# Patient Record
Sex: FEMALE | Race: WHITE | URBAN - METROPOLITAN AREA
[De-identification: names, ages, dates, MRNs, and addresses within clinical notes are randomized per-mention and may not be internally consistent; named-entity substitution may affect disease eponyms.]

---

## 2020-12-19 ENCOUNTER — OFFICE VISIT (OUTPATIENT)
Dept: URGENT CARE | Age: 25
End: 2020-12-19
Payer: COMMERCIAL

## 2020-12-19 VITALS — RESPIRATION RATE: 16 BRPM | OXYGEN SATURATION: 99 % | HEART RATE: 96 BPM | TEMPERATURE: 99.4 F

## 2020-12-19 DIAGNOSIS — Z11.59 SCREENING FOR VIRAL DISEASE: Primary | ICD-10-CM

## 2020-12-19 PROCEDURE — 99201 PR OFFICE OUTPATIENT NEW 10 MINUTES: CPT | Performed by: INTERNAL MEDICINE

## 2020-12-19 RX ORDER — DEXTROAMPHETAMINE SACCHARATE, AMPHETAMINE ASPARTATE MONOHYDRATE, DEXTROAMPHETAMINE SULFATE AND AMPHETAMINE SULFATE 7.5; 7.5; 7.5; 7.5 MG/1; MG/1; MG/1; MG/1
40 CAPSULE, EXTENDED RELEASE ORAL
COMMUNITY

## 2020-12-19 RX ORDER — OMEPRAZOLE 40 MG/1
40 CAPSULE, DELAYED RELEASE ORAL DAILY
COMMUNITY

## 2020-12-19 NOTE — PROGRESS NOTES
HISTORY OF PRESENT ILLNESS  Yoselyn Hope is a 26 y.o. female. Patient comes in for a possible exposure to [employer]. Patient was notified through [employer] elana. Reports that she would have been at a [employer] and a drive in movie, both with all people masked. Reports a scratchy throat as of this morning. Is not really bothersome at this time. No cough, fever, CP, SOB  Visit Vitals  Pulse 96   Temp 99.4 °F (37.4 °C)   Resp 16   LMP 11/19/2020   SpO2 99%     HPI    Review of Systems   Constitutional: Negative. HENT: Negative. Respiratory: Negative. Cardiovascular: Negative. Neurological: Negative. Physical Exam  Vitals signs and nursing note reviewed. HENT:      Nose: Nose normal.      Mouth/Throat:      Pharynx: No oropharyngeal exudate or posterior oropharyngeal erythema. Cardiovascular:      Rate and Rhythm: Normal rate and regular rhythm. Pulmonary:      Breath sounds: Normal breath sounds. Abdominal:      General: Bowel sounds are normal.      Palpations: Abdomen is soft. Skin:     General: Skin is warm. Neurological:      Mental Status: She is alert. ASSESSMENT and PLAN  Diagnoses and all orders for this visit:    1.  Screening for viral disease  -     NOVEL CORONAVIRUS (COVID-19)        Does not want testing for strep at this time     lab results and schedule of future lab studies reviewed with patient  reviewed medications and side effects in detail

## 2020-12-21 LAB — SARS-COV-2, NAA: NOT DETECTED

## 2023-03-05 ENCOUNTER — APPOINTMENT (OUTPATIENT)
Dept: CT IMAGING | Age: 28
End: 2023-03-05
Attending: NURSE PRACTITIONER
Payer: COMMERCIAL

## 2023-03-05 ENCOUNTER — HOSPITAL ENCOUNTER (EMERGENCY)
Age: 28
Discharge: HOME | End: 2023-03-06
Attending: EMERGENCY MEDICINE
Payer: COMMERCIAL

## 2023-03-05 VITALS
RESPIRATION RATE: 18 BRPM | DIASTOLIC BLOOD PRESSURE: 96 MMHG | HEART RATE: 88 BPM | TEMPERATURE: 97.8 F | OXYGEN SATURATION: 99 % | SYSTOLIC BLOOD PRESSURE: 166 MMHG

## 2023-03-05 DIAGNOSIS — R10.817 GENERALIZED ABDOMINAL TENDERNESS, REBOUND TENDERNESS PRESENCE NOT SPECIFIED: Primary | ICD-10-CM

## 2023-03-05 LAB
ALBUMIN SERPL-MCNC: 3.7 G/DL (ref 3.5–5)
ALBUMIN/GLOB SERPL: 1 (ref 1.1–2.2)
ALP SERPL-CCNC: 69 U/L (ref 45–117)
ALT SERPL-CCNC: 25 U/L (ref 12–78)
ANION GAP SERPL CALC-SCNC: 5 MMOL/L (ref 5–15)
APPEARANCE UR: CLEAR
AST SERPL-CCNC: 13 U/L (ref 15–37)
BACTERIA URNS QL MICRO: NEGATIVE /HPF
BILIRUB SERPL-MCNC: 0.3 MG/DL (ref 0.2–1)
BILIRUB UR QL: NEGATIVE
BUN SERPL-MCNC: 8 MG/DL (ref 6–20)
BUN/CREAT SERPL: 10 (ref 12–20)
CALCIUM SERPL-MCNC: 9.5 MG/DL (ref 8.5–10.1)
CHLORIDE SERPL-SCNC: 107 MMOL/L (ref 97–108)
CO2 SERPL-SCNC: 27 MMOL/L (ref 21–32)
COLOR UR: NORMAL
COMMENT, HOLDF: NORMAL
CREAT SERPL-MCNC: 0.82 MG/DL (ref 0.55–1.02)
EPITH CASTS URNS QL MICRO: NORMAL /LPF
GLOBULIN SER CALC-MCNC: 3.8 G/DL (ref 2–4)
GLUCOSE SERPL-MCNC: 97 MG/DL (ref 65–100)
GLUCOSE UR STRIP.AUTO-MCNC: NEGATIVE MG/DL
HCG UR QL: NEGATIVE
HGB UR QL STRIP: NEGATIVE
HYALINE CASTS URNS QL MICRO: NORMAL /LPF (ref 0–5)
KETONES UR QL STRIP.AUTO: NEGATIVE MG/DL
LEUKOCYTE ESTERASE UR QL STRIP.AUTO: NEGATIVE
LIPASE SERPL-CCNC: 120 U/L (ref 73–393)
NITRITE UR QL STRIP.AUTO: NEGATIVE
PH UR STRIP: 6.5 (ref 5–8)
POTASSIUM SERPL-SCNC: 3.4 MMOL/L (ref 3.5–5.1)
PROT SERPL-MCNC: 7.5 G/DL (ref 6.4–8.2)
PROT UR STRIP-MCNC: NEGATIVE MG/DL
RBC #/AREA URNS HPF: NORMAL /HPF (ref 0–5)
SAMPLES BEING HELD,HOLD: NORMAL
SODIUM SERPL-SCNC: 139 MMOL/L (ref 136–145)
SP GR UR REFRACTOMETRY: 1.01 (ref 1–1.03)
UR CULT HOLD, URHOLD: NORMAL
UROBILINOGEN UR QL STRIP.AUTO: 0.2 EU/DL (ref 0.2–1)
WBC URNS QL MICRO: NORMAL /HPF (ref 0–4)

## 2023-03-05 PROCEDURE — 80053 COMPREHEN METABOLIC PANEL: CPT

## 2023-03-05 PROCEDURE — 96361 HYDRATE IV INFUSION ADD-ON: CPT

## 2023-03-05 PROCEDURE — 81001 URINALYSIS AUTO W/SCOPE: CPT

## 2023-03-05 PROCEDURE — 99285 EMERGENCY DEPT VISIT HI MDM: CPT

## 2023-03-05 PROCEDURE — 74011250636 HC RX REV CODE- 250/636: Performed by: NURSE PRACTITIONER

## 2023-03-05 PROCEDURE — 36415 COLL VENOUS BLD VENIPUNCTURE: CPT

## 2023-03-05 PROCEDURE — 74177 CT ABD & PELVIS W/CONTRAST: CPT

## 2023-03-05 PROCEDURE — 96374 THER/PROPH/DIAG INJ IV PUSH: CPT

## 2023-03-05 PROCEDURE — 83690 ASSAY OF LIPASE: CPT

## 2023-03-05 PROCEDURE — 74011000636 HC RX REV CODE- 636: Performed by: RADIOLOGY

## 2023-03-05 PROCEDURE — 74011250637 HC RX REV CODE- 250/637: Performed by: NURSE PRACTITIONER

## 2023-03-05 PROCEDURE — 81025 URINE PREGNANCY TEST: CPT

## 2023-03-05 PROCEDURE — 96375 TX/PRO/DX INJ NEW DRUG ADDON: CPT

## 2023-03-05 RX ORDER — KETOROLAC TROMETHAMINE 30 MG/ML
15 INJECTION, SOLUTION INTRAMUSCULAR; INTRAVENOUS
Status: COMPLETED | OUTPATIENT
Start: 2023-03-05 | End: 2023-03-05

## 2023-03-05 RX ORDER — FAMOTIDINE 20 MG/1
20 TABLET, FILM COATED ORAL 2 TIMES DAILY
Qty: 20 TABLET | Refills: 0 | Status: SHIPPED | OUTPATIENT
Start: 2023-03-05 | End: 2023-03-15

## 2023-03-05 RX ORDER — ONDANSETRON 2 MG/ML
4 INJECTION INTRAMUSCULAR; INTRAVENOUS
Status: COMPLETED | OUTPATIENT
Start: 2023-03-05 | End: 2023-03-05

## 2023-03-05 RX ORDER — DICYCLOMINE HYDROCHLORIDE 20 MG/1
20 TABLET ORAL EVERY 6 HOURS
Qty: 20 TABLET | Refills: 0 | Status: SHIPPED | OUTPATIENT
Start: 2023-03-05 | End: 2023-03-10

## 2023-03-05 RX ORDER — DICYCLOMINE HYDROCHLORIDE 10 MG/1
10 CAPSULE ORAL
Status: COMPLETED | OUTPATIENT
Start: 2023-03-05 | End: 2023-03-05

## 2023-03-05 RX ADMIN — DICYCLOMINE HYDROCHLORIDE 10 MG: 10 CAPSULE ORAL at 22:28

## 2023-03-05 RX ADMIN — IOPAMIDOL 100 ML: 755 INJECTION, SOLUTION INTRAVENOUS at 22:42

## 2023-03-05 RX ADMIN — KETOROLAC TROMETHAMINE 15 MG: 30 INJECTION, SOLUTION INTRAMUSCULAR at 22:28

## 2023-03-05 RX ADMIN — ONDANSETRON HYDROCHLORIDE 4 MG: 2 SOLUTION INTRAMUSCULAR; INTRAVENOUS at 22:27

## 2023-03-05 RX ADMIN — SODIUM CHLORIDE 1000 ML: 9 INJECTION, SOLUTION INTRAVENOUS at 22:28

## 2023-03-05 NOTE — Clinical Note
[address]  [address] [facility] [address]  [phone]    Work/School Note    Date: 3/5/2023    To Whom It May concern:    Jerry Syed was seen and treated today in the emergency room by the following provider(s):  Attending Provider: Evelina Bills MD  Nurse Practitioner: Vivian Ramírez NP. Jerry Syed is excused from work/school on 03/05/23 and 03/06/23. She is medically clear to return to work/school on 3/7/2023.        Sincerely,          Arne Osler, NP

## 2023-03-05 NOTE — Clinical Note
[address]  [address] [facility] [address]  [phone]    Work/School Note    Date: 3/5/2023    To Whom It May concern:    Breanna Freeman was seen and treated today in the emergency room by the following provider(s):  Attending Provider: Saul Sarah MD  Nurse Practitioner: Steff Vivar NP. Breanna Freeman is excused from work/school on 03/06/23 and 03/07/23. She is medically clear to return to work/school on 3/8/2023.        Sincerely,          Schuyler Boeck, NP

## 2023-03-06 PROCEDURE — 74011250637 HC RX REV CODE- 250/637: Performed by: NURSE PRACTITIONER

## 2023-03-06 RX ORDER — CYCLOBENZAPRINE HCL 10 MG
10 TABLET ORAL
Status: COMPLETED | OUTPATIENT
Start: 2023-03-06 | End: 2023-03-06

## 2023-03-06 RX ORDER — POTASSIUM CHLORIDE 750 MG/1
20 TABLET, FILM COATED, EXTENDED RELEASE ORAL
Status: COMPLETED | OUTPATIENT
Start: 2023-03-06 | End: 2023-03-06

## 2023-03-06 RX ORDER — KETOROLAC TROMETHAMINE 10 MG/1
10 TABLET, FILM COATED ORAL
Qty: 20 TABLET | Refills: 0 | Status: SHIPPED | OUTPATIENT
Start: 2023-03-06 | End: 2023-03-11

## 2023-03-06 RX ORDER — CYCLOBENZAPRINE HCL 10 MG
10 TABLET ORAL
Qty: 15 TABLET | Refills: 0 | Status: SHIPPED | OUTPATIENT
Start: 2023-03-06 | End: 2023-03-11

## 2023-03-06 RX ADMIN — CYCLOBENZAPRINE 10 MG: 10 TABLET, FILM COATED ORAL at 00:11

## 2023-03-06 RX ADMIN — POTASSIUM CHLORIDE 20 MEQ: 750 TABLET, FILM COATED, EXTENDED RELEASE ORAL at 00:11

## 2023-03-06 NOTE — DISCHARGE INSTRUCTIONS
Today you were seen in the emergency room for abdominal pain. While you were here we obtained a CT scan that was reassuring. We also gave you the medication Bentyl-which is helpful for gas related pains and an anti-inflammatory Toradol which is similar to ibuprofen. Please call your primary care provider when you leave the ER to schedule a follow-up appointment at the next available, so they can reassess you and make sure you are getting better as we would expect due to. If you do not have a primary care provider, I have attached 1 that I recommend to this document. I would also like you to establish with a specialty provider that I have attached to this document. Please call them to make an appointment. If your abdominal pain acutely gets worse, you are unable to keep down fluids or develop blood in your vomit or stool please represent to the emergency room.

## 2023-03-06 NOTE — ED TRIAGE NOTES
TRIAGE NOTE:  Patient arrives ambulatory with c/o pain that started sudden in right side and then went to right side of neck. Patient states pain is now on both sides, and reports cramping to abdomen.

## 2023-03-06 NOTE — ED NOTES
4591 - Patient discharged by [facility] MD - pt sent to the front lobby, with strong and steady gait, no acute distress noted at time of discharge -  Discharge information / home RX / and reasons to return to the ED were reviewed by the ED provider.     Family member at side for comfort care and for a ride home;;

## 2023-03-06 NOTE — ED PROVIDER NOTES
HPI   Patient is a 33 y.o. F who presents today with complaints of abdominal pain. Patient states she began having right-sided abdominal pain at about 7 PM.  States that it originally began on her right side radiated up to her neck and now encompassing the entire abdomen. Describes as a crampy in nature. States it is persistent but waxes and wanes in intensity. Denies fevers, nausea, vomiting, dysuria, hematuria. No diarrhea, constipation, hematochezia, melena, hematemesis. No focal weakness, numbness or tingling. No history of abdominal surgery. Denies concerns regarding vaginal discharge or STI. Denies pelvic pain. There are no other complaints, changes or physical findings at this time. ALLERGIES: Patient has no known allergies. History reviewed. No pertinent past medical history. No past surgical history on file. History reviewed. No pertinent family history. Social History     Socioeconomic History    Marital status: SINGLE     Spouse name: Not on file    Number of children: Not on file    Years of education: Not on file    Highest education level: Not on file   Occupational History    Not on file   Tobacco Use    Smoking status: Not on file    Smokeless tobacco: Not on file   Substance and Sexual Activity    Alcohol use: Not on file    Drug use: Not on file    Sexual activity: Not on file   Other Topics Concern    Not on file   Social History Narrative    Not on file     Social Determinants of Health     Financial Resource Strain: Not on file   Food Insecurity: Not on file   Transportation Needs: Not on file   Physical Activity: Not on file   Stress: Not on file   Social Connections: Not on file   Intimate Partner Violence: Not on file   Housing Stability: Not on file           Review of Systems   Constitutional:  Negative for fever. HENT:  Negative for congestion. Eyes:  Negative for discharge. Respiratory:  Negative for shortness of breath.     Cardiovascular:  Negative for chest pain. Gastrointestinal:  Negative for nausea. Genitourinary:  Negative for dysuria. Musculoskeletal:  Negative for myalgias. Neurological:  Negative for seizures. Psychiatric/Behavioral:  Negative for behavioral problems. Vitals:    03/05/23 2006 03/05/23 2350   BP: (!) 166/96    Pulse: (!) 119 88   Resp: 18    Temp: 97.8 °F (36.6 °C)    SpO2: 99%             Physical Exam  Constitutional:       Appearance: Normal appearance. HENT:      Head: Normocephalic and atraumatic. Eyes:      Pupils: Pupils are equal, round, and reactive to light. Neck:      Comments: No midline tenderness  Cardiovascular:      Rate and Rhythm: Normal rate and regular rhythm. Heart sounds: Normal heart sounds. Pulmonary:      Effort: Pulmonary effort is normal.      Breath sounds: Normal breath sounds. Abdominal:      General: Abdomen is flat. Tenderness: There is abdominal tenderness (Generalized tenderness that does not radiate to any quadrant. ). There is no right CVA tenderness or left CVA tenderness. Comments: Nonperitoneal abdominal exam.  No pelvic tenderness   Musculoskeletal:      Cervical back: Neck supple. Skin:     General: Skin is dry. Neurological:      General: No focal deficit present. Mental Status: She is alert and oriented to person, place, and time. Mental status is at baseline. Sensory: No sensory deficit.    Psychiatric:         Mood and Affect: Mood normal.         Behavior: Behavior normal.            LABORATORY RESULTS:  Recent Results (from the past 24 hour(s))   METABOLIC PANEL, COMPREHENSIVE    Collection Time: 03/05/23  9:22 PM   Result Value Ref Range    Sodium 139 136 - 145 mmol/L    Potassium 3.4 (L) 3.5 - 5.1 mmol/L    Chloride 107 97 - 108 mmol/L    CO2 27 21 - 32 mmol/L    Anion gap 5 5 - 15 mmol/L    Glucose 97 65 - 100 mg/dL    BUN 8 6 - 20 MG/DL    Creatinine 0.82 0.55 - 1.02 MG/DL    BUN/Creatinine ratio 10 (L) 12 - 20      eGFR >60 >60 ml/min/1.73m2    Calcium 9.5 8.5 - 10.1 MG/DL    Bilirubin, total 0.3 0.2 - 1.0 MG/DL    ALT (SGPT) 25 12 - 78 U/L    AST (SGOT) 13 (L) 15 - 37 U/L    Alk. phosphatase 69 45 - 117 U/L    Protein, total 7.5 6.4 - 8.2 g/dL    Albumin 3.7 3.5 - 5.0 g/dL    Globulin 3.8 2.0 - 4.0 g/dL    A-G Ratio 1.0 (L) 1.1 - 2.2     LIPASE    Collection Time: 03/05/23  9:22 PM   Result Value Ref Range    Lipase 120 73 - 393 U/L   SAMPLES BEING HELD    Collection Time: 03/05/23  9:22 PM   Result Value Ref Range    SAMPLES BEING HELD 1RED, 1LAV     COMMENT        Add-on orders for these samples will be processed based on acceptable specimen integrity and analyte stability, which may vary by analyte. URINALYSIS W/MICROSCOPIC    Collection Time: 03/05/23 10:20 PM   Result Value Ref Range    Color YELLOW/STRAW      Appearance CLEAR CLEAR      Specific gravity 1.015 1.003 - 1.030      pH (UA) 6.5 5.0 - 8.0      Protein Negative NEG mg/dL    Glucose Negative NEG mg/dL    Ketone Negative NEG mg/dL    Bilirubin Negative NEG      Blood Negative NEG      Urobilinogen 0.2 0.2 - 1.0 EU/dL    Nitrites Negative NEG      Leukocyte Esterase Negative NEG      WBC 0-4 0 - 4 /hpf    RBC 0-5 0 - 5 /hpf    Epithelial cells FEW FEW /lpf    Bacteria Negative NEG /hpf    Hyaline cast 0-2 0 - 5 /lpf   URINE CULTURE HOLD SAMPLE    Collection Time: 03/05/23 10:20 PM    Specimen: Urine   Result Value Ref Range    Urine culture hold        Urine on hold in Microbiology dept for 2 days. If unpreserved urine is submitted, it cannot be used for addtional testing after 24 hours, recollection will be required. HCG URINE, QL. - POC    Collection Time: 03/05/23 10:23 PM   Result Value Ref Range    Pregnancy test,urine (POC) Negative NEG         IMAGING RESULTS:  CT ABD PELV W CONT    Result Date: 3/5/2023  No acute abnormality in the abdomen or pelvis.      MEDICATIONS GIVEN:  Medications   sodium chloride 0.9 % bolus infusion 1,000 mL (0 mL IntraVENous IV Completed 3/6/23 0017)   ketorolac (TORADOL) injection 15 mg (15 mg IntraVENous Given 3/5/23 2228)   ondansetron (ZOFRAN) injection 4 mg (4 mg IntraVENous Given 3/5/23 2227)   dicyclomine (BENTYL) capsule 10 mg (10 mg Oral Given 3/5/23 2228)   iopamidoL (ISOVUE-370) 370 mg iodine /mL (76 %) injection 100 mL (100 mL IntraVENous Given 3/5/23 2242)   potassium chloride SR (KLOR-CON 10) tablet 20 mEq (20 mEq Oral Given 3/6/23 0011)   cyclobenzaprine (FLEXERIL) tablet 10 mg (10 mg Oral Given 3/6/23 0011)          Medical Decision Making  Patient presents today with chief complaint of abdominal pain. On exam, patient does have generalized abdominal tenderness to palpation that does not radiate to any quadrant. Exam is non-peritoneal, no guarding or rigidity. Considered differentials including pyelonephritis, UA shows no evidence of UTI. Considered nephrolithiasis, small bowel obstruction, acute appendicitis, diverticulitis, cholecystitis, aortic dissection, pancreatitis, so CT abdomen and pelvis performed and is unremarkable. Also considered PID, TOA, vaginitis, urethritis, ovarian torsion, ectopic pregnancy, spontaneous . hCG negative. no pelvic tenderness palpation, no vaginal discharge or concern for STI, doubt pelvic pathology for symptoms. Blood work is reassuring, normal kidney function, normal liver enzymes. Presentation not consistent with acute, emergent causes of abdominal pain at this time. Patient is tolerating PO fluids and well-appearing. Patient's neck pain resolved here in the emergency department after Toradol. Potassium was 3.4, repleted patient was here in the emergency department. Could potentially have a musculoskeletal element. Will discharge with Toradol, Flexeril, Bentyl, and Pepcid. Recommended follow-up with PMD and GI. Return precautions discussed. Amount and/or Complexity of Data Reviewed  Labs: ordered. Radiology: ordered.     Risk  Prescription drug management. Presentation, management, and disposition were discussed with the attending physician, Dr. Stella Marcelino, who is in agreement with plan of care. Discussed results and work-up with patient and answered all questions, the patient expresses understanding and agrees with the care plan and disposition. The patient was given an opportunity to ask questions and all concerns raised were addressed prior to discharge. Recommended patient follow-up with provider as listed below. Counseled patient on standard home and self-care measures. Specifically explained the emergent conditions that could arise and clearly instructed the patient to return to the emergency department for those and any other new, worsening, or concerning symptoms. Patient stable and ready for discharge. IMPRESSION:  1. Generalized abdominal tenderness, rebound tenderness presence not specified        DISPOSITION:  Discharge    PLAN:  Follow-up Information       Follow up With Specialties Details Why Contact Info    DEIDRE Mackay  Schedule an appointment as soon as possible for a visit   [address] [phone]    [facility]  Schedule an appointment as soon as possible for a visit   [address] [place] [address] [phone]    [address] [facility] [address] [facility]  As needed, If symptoms worsen [address] [facility]  [phone]          Discharge Medication List as of 3/6/2023 12:01 AM        START taking these medications    Details   ketorolac (TORADOL) 10 mg tablet Take 1 Tablet by mouth every six (6) hours as needed for Pain for up to 5 days. , Normal, Disp-20 Tablet, R-0      cyclobenzaprine (FLEXERIL) 10 mg tablet Take 1 Tablet by mouth three (3) times daily as needed for Muscle Spasm(s) for up to 5 days. , Normal, Disp-15 Tablet, R-0      dicyclomine (BENTYL) 20 mg tablet Take 1 Tablet by mouth every six (6) hours for 5 days. , Normal, Disp-20 Tablet, R-0      famotidine (Pepcid) 20 mg tablet Take 1 Tablet by mouth two (2) times a day for 10 days. , Normal, Disp-20 Tablet, R-0           CONTINUE these medications which have NOT CHANGED    Details   amphetamine-dextroamphetamine XR (Adderall XR) 30 mg XR capsule Take 40 mg by mouth every morning., Historical Med      omeprazole (PRILOSEC) 40 mg capsule Take 40 mg by mouth daily. , Historical Med             Please note that this dictation was completed with [employer], the [employer] voice recognition software. Quite often unanticipated grammatical, syntax, homophones, and other interpretive errors are inadvertently transcribed by the computer software. Please disregard these errors. Please excuse any errors that have escaped final proofreading.

## 2023-05-22 RX ORDER — DEXTROAMPHETAMINE SACCHARATE, AMPHETAMINE ASPARTATE MONOHYDRATE, DEXTROAMPHETAMINE SULFATE AND AMPHETAMINE SULFATE 7.5; 7.5; 7.5; 7.5 MG/1; MG/1; MG/1; MG/1
40 CAPSULE, EXTENDED RELEASE ORAL EVERY MORNING
COMMUNITY

## 2023-05-22 RX ORDER — OMEPRAZOLE 40 MG/1
40 CAPSULE, DELAYED RELEASE ORAL DAILY
COMMUNITY

## 2025-06-16 ENCOUNTER — OFFICE VISIT (OUTPATIENT)
Age: 30
End: 2025-06-16

## 2025-06-16 VITALS
RESPIRATION RATE: 18 BRPM | DIASTOLIC BLOOD PRESSURE: 80 MMHG | WEIGHT: 245 LBS | SYSTOLIC BLOOD PRESSURE: 135 MMHG | OXYGEN SATURATION: 100 % | TEMPERATURE: 98.2 F | HEART RATE: 100 BPM

## 2025-06-16 DIAGNOSIS — J20.8 ACUTE VIRAL BRONCHITIS: Primary | ICD-10-CM

## 2025-06-16 RX ORDER — PREDNISONE 20 MG/1
40 TABLET ORAL DAILY
Qty: 10 TABLET | Refills: 0 | Status: SHIPPED | OUTPATIENT
Start: 2025-06-16 | End: 2025-06-21

## 2025-06-16 RX ORDER — INHALER, ASSIST DEVICES
1 SPACER (EA) MISCELLANEOUS PRN
Qty: 1 EACH | Refills: 0 | Status: SHIPPED | OUTPATIENT
Start: 2025-06-16

## 2025-06-16 RX ORDER — ALBUTEROL SULFATE 90 UG/1
2 INHALANT RESPIRATORY (INHALATION) 4 TIMES DAILY PRN
Qty: 18 G | Refills: 0 | Status: SHIPPED | OUTPATIENT
Start: 2025-06-16

## 2025-06-16 RX ORDER — NORETHINDRONE 0.35 MG/1
TABLET ORAL
COMMUNITY
Start: 2025-04-19

## 2025-06-16 RX ORDER — HYDROXYCHLOROQUINE SULFATE 200 MG/1
TABLET, FILM COATED ORAL
COMMUNITY
Start: 2025-04-22

## 2025-06-16 NOTE — PROGRESS NOTES
2025   Nery Tomlinson (: 1995) is a 29 y.o. female, New patient, here for evaluation of the following chief complaint(s):  Cough (C/o congestion, cough, runny nose and sore throat. Sx 5 days. )     ASSESSMENT/PLAN:  Below is the assessment and plan developed based on review of pertinent history, physical exam, labs, studies, and medications.  Assessment & Plan  Acute viral bronchitis       Orders:    predniSONE (DELTASONE) 20 MG tablet; Take 2 tablets by mouth daily for 5 days    albuterol sulfate HFA (VENTOLIN HFA) 108 (90 Base) MCG/ACT inhaler; Inhale 2 puffs into the lungs 4 times daily as needed for Wheezing    Spacer/Aero-Holding Chambers (AEROCHAMBER MV) MISC; 1 applicator by Does not apply route as needed (with albuterol inhaler)  Exam and history consistent with viral upper respiratory infection with cough.  -Albuterol inhaler with spacer every 6 hours as needed for coughing fits/shortness of breath  -Prednisone 40 mg x 5 days   -Increase fluid intake to maintain hydration and to help fight infection. Please drink at least 64 ounces of fluid a day  -Ibuprofen 600 mg every 6 hours as needed and Tylenol 500 mg every 6 hours as needed for fever/pain  -Mucinex DM, Tylenol severe cold and flu, or DayQuil for symptomatic relief and decongestion  -1 tablespoon of honey or mixed with hot tea for help with cough.  Recommend taking 30 minutes before sleep to help with cough at night  -Steam inhalation, warm compresses, humidifier, and warm soup can also help  -Please follow-up with your PCP in the next 2 to 4 weeks    If symptoms persist or worsen, please contact PCP and/or return to urgent care.          Handout given with care instructions  2. OTC for symptom management. Increase fluid intake, ensure adequate nutritional intake.  3. Follow up with PCP as needed.  4. Go to ED with development of any acute symptoms.     Follow up:  Return if symptoms worsen or fail to improve.  Follow up immediately for

## 2025-06-16 NOTE — PATIENT INSTRUCTIONS
Exam and history consistent with viral upper respiratory infection with cough.  -Albuterol inhaler with spacer every 6 hours as needed for coughing fits/shortness of breath  -Prednisone 40 mg x 5 days   -Increase fluid intake to maintain hydration and to help fight infection. Please drink at least 64 ounces of fluid a day  -Ibuprofen 600 mg every 6 hours as needed and Tylenol 500 mg every 6 hours as needed for fever/pain  -Mucinex Fastmax, Tylenol severe cold and flu, or DayQuil for symptomatic relief and decongestion  -1 tablespoon of honey or mixed with hot tea for help with cough.  Recommend taking 30 minutes before sleep to help with cough at night  -Steam inhalation, warm compresses, humidifier, and warm soup can also help  -Please follow-up with your PCP in the next 2 to 4 weeks    If symptoms persist or worsen, please contact PCP and/or return to urgent care.